# Patient Record
Sex: MALE | Race: WHITE | NOT HISPANIC OR LATINO | Employment: FULL TIME | URBAN - METROPOLITAN AREA
[De-identification: names, ages, dates, MRNs, and addresses within clinical notes are randomized per-mention and may not be internally consistent; named-entity substitution may affect disease eponyms.]

---

## 2023-01-02 ENCOUNTER — OFFICE VISIT (OUTPATIENT)
Dept: URGENT CARE | Facility: URGENT CARE | Age: 23
End: 2023-01-02
Payer: COMMERCIAL

## 2023-01-02 ENCOUNTER — ANCILLARY PROCEDURE (OUTPATIENT)
Dept: GENERAL RADIOLOGY | Facility: CLINIC | Age: 23
End: 2023-01-02
Attending: PHYSICIAN ASSISTANT
Payer: COMMERCIAL

## 2023-01-02 VITALS
DIASTOLIC BLOOD PRESSURE: 77 MMHG | RESPIRATION RATE: 16 BRPM | WEIGHT: 180 LBS | OXYGEN SATURATION: 99 % | HEART RATE: 65 BPM | SYSTOLIC BLOOD PRESSURE: 131 MMHG | TEMPERATURE: 98.3 F

## 2023-01-02 DIAGNOSIS — S49.92XA SHOULDER INJURY, LEFT, INITIAL ENCOUNTER: ICD-10-CM
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
## 2023-01-02 DIAGNOSIS — M25.512 ARTHRALGIA OF LEFT ACROMIOCLAVICULAR JOINT: ICD-10-CM

## 2023-01-02 DIAGNOSIS — S49.92XA SHOULDER INJURY, LEFT, INITIAL ENCOUNTER: Primary | ICD-10-CM

## 2023-01-02 PROCEDURE — 73050 X-RAY EXAM OF SHOULDERS: CPT | Mod: TC | Performed by: RADIOLOGY

## 2023-01-02 PROCEDURE — 99203 OFFICE O/P NEW LOW 30 MIN: CPT | Performed by: PHYSICIAN ASSISTANT

## 2023-01-02 RX ORDER — IBUPROFEN 800 MG/1
800 TABLET, FILM COATED ORAL EVERY 8 HOURS PRN
Qty: 30 TABLET | Refills: 0 | Status: SHIPPED | OUTPATIENT
Start: 2023-01-02 | End: 2024-01-02

## 2023-01-02 NOTE — LETTER
[facility]  [address] [address]  Phone: [phone]    January 2, 2023      RE: Noam Mccullough  [address] [place] [id number]       To whom it may concern:    Noam Mccullough was seen in our clinic today, 01/02/2023. He  may return to work on 01/04/2023.     Sincerely,      Hitesh Galloway PA-C

## 2023-01-02 NOTE — PROGRESS NOTES
Assessment & Plan     Shoulder injury, left, initial encounter    AC joint xray Negative for acute findings, read by Hitesh Galloway PA-C [facility] at time of visit.    Rest, ice compresses  Motrin for inflammation and pain  Sling for comfort  - XR Acromioclavicular Joint Bilateral; Future  - ibuprofen (ADVIL/MOTRIN) 800 MG tablet; Take 1 tablet (800 mg) by mouth every 8 hours as needed    Arthralgia of left acromioclavicular joint    NO signs of AC joint seperation  Ice compresses  Motrin for inflammation and tenderness  ROM exercises    The AC or acromioclavicular joint is at the end of the collar bone, or clavicle, near the shoulder. The AC joint is made of 4 ligaments that hold the collar bone to the shoulder blade, or scapula. With an AC joint sprain, these ligaments may be partly or fully torn. In both cases, this causes pain and swelling at the end of the collar bone. If the ligaments are completely torn, the collar bone will rise up.  AC joint sprains are given a grade depending on whether they are mild, moderate, or severe:    Grade 1. A mild sprain, with minor damage to the ligaments. The collar bone stays in place.    Grade 2. A moderate sprain. The ligaments are partly torn. The collar bone is moved out of place. The injured shoulder may look lower and flatter than the other shoulder.    Grade 3. The most severe kind of sprain. The ligaments are completely torn. The collar bone is no longer joined to the shoulder blade. The collar bone rises up. This creates a bump on top of the shoulder. The ligaments heal in this position, so the bump does not go away. It is possible to have surgery to correct the bump. But normal shoulder function will usually return even without surgery.  An AC sprain will take up to 6 weeks or longer to heal, depending on how severe it is. It is often treated with a sling. Or a sling and an elastic wrap around the chest may be used. Physical therapy may be needed to help the shoulder keep  full range of motion. Once healed, you can usually expect full recovery of shoulder function.  Home care    Your provider may prescribe medicines for pain. Or you may use over-the-counter pain medicines. Talk with your provider before taking medicines if you have chronic liver or kidney disease, or have ever had a stomach ulcer or gastrointestinal bleeding.    Make an appointment right away to see your provider or an orthopedic or bone doctor, for further evaluation and treatment of the injury.    - XR Acromioclavicular Joint Bilateral; Future  - ibuprofen (ADVIL/MOTRIN) 800 MG tablet; Take 1 tablet (800 mg) by mouth every 8 hours as needed    Snowboard accident, initial encounter    Patient had fall off snowboard, injured left shoulder      Review of external notes as documented elsewhere in note       CONSULTATION/REFERRAL to Ortho if symptoms persist in the future    No follow-ups on file.    Hitesh Galloway, [facility], PA-C  [facility] JAZZY Santacruz is a 22 year old, presenting for the following health issues:  Shoulder (Fell yesterday snow boarding and hurt Left shoulder )      HPI   Review of Systems   Constitutional, HEENT, cardiovascular, pulmonary, GI, , musculoskeletal, neuro, skin, endocrine and psych systems are negative, except as otherwise noted.      Objective    /77   Pulse 65   Temp 98.3  F (36.8  C) (Oral)   Resp 16   Wt 81.6 kg (180 lb)   SpO2 99%   There is no height or weight on file to calculate BMI.  Physical Exam   GENERAL: healthy, alert and no distress  EYES: Eyes grossly normal to inspection, PERRL and conjunctivae and sclerae normal  HENT: ear canals and TM's normal, nose and mouth without ulcers or lesions  NECK: no adenopathy, no asymmetry, masses, or scars and thyroid normal to palpation  RESP: lungs clear to auscultation - no rales, rhonchi or wheezes  CV: regular rate and rhythm, normal S1 S2, no S3 or S4, no murmur, click or rub, no  peripheral edema and peripheral pulses strong  MS: DROM but has full ROM just with disccomfort in left AC joint.  Positive for tenderness over AC joint  SKIN: no suspicious lesions or rashes  NEURO: Normal strength and tone, mentation intact and speech normal  PSYCH: mentation appears normal, affect normal/bright        Shoulder xray Negative for acute findings, read by Hitesh DAVIS at time of visit.